# Patient Record
Sex: MALE | Race: ASIAN | NOT HISPANIC OR LATINO | Employment: UNEMPLOYED | URBAN - METROPOLITAN AREA
[De-identification: names, ages, dates, MRNs, and addresses within clinical notes are randomized per-mention and may not be internally consistent; named-entity substitution may affect disease eponyms.]

---
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
## 2024-02-27 ENCOUNTER — TRANSFERRED RECORDS (OUTPATIENT)
Dept: HEALTH INFORMATION MANAGEMENT | Facility: CLINIC | Age: 19
End: 2024-02-27
Payer: COMMERCIAL

## 2024-02-27 ENCOUNTER — MEDICAL CORRESPONDENCE (OUTPATIENT)
Dept: HEALTH INFORMATION MANAGEMENT | Facility: CLINIC | Age: 19
End: 2024-02-27
Payer: COMMERCIAL

## 2024-02-27 LAB
ALT SERPL-CCNC: 23 IU/L (ref 0–44)
AST SERPL-CCNC: 22 IU/L (ref 0–40)
CREATININE (EXTERNAL): 0.87 MG/DL (ref 0.76–1.27)
GFR ESTIMATED (EXTERNAL): 127 ML/MIN/1.73
GLUCOSE (EXTERNAL): 95 MG/DL (ref 70–99)
POTASSIUM (EXTERNAL): 4.2 MMOL/L (ref 3.5–5.2)

## 2024-04-24 ENCOUNTER — TRANSFERRED RECORDS (OUTPATIENT)
Dept: HEALTH INFORMATION MANAGEMENT | Facility: CLINIC | Age: 19
End: 2024-04-24
Payer: COMMERCIAL

## 2024-04-24 LAB
ALT SERPL-CCNC: 15 IU/L (ref 0–44)
AST SERPL-CCNC: 21 IU/L (ref 0–40)
CREATININE (EXTERNAL): 0.91 MG/DL (ref 0.76–1.27)
GFR ESTIMATED (EXTERNAL): 125 ML/MIN/1.73
GLUCOSE (EXTERNAL): 87 MG/DL (ref 70–99)
POTASSIUM (EXTERNAL): 4.2 MMOL/L (ref 3.5–5.2)

## 2024-05-14 ENCOUNTER — TRANSFERRED RECORDS (OUTPATIENT)
Dept: HEALTH INFORMATION MANAGEMENT | Facility: CLINIC | Age: 19
End: 2024-05-14
Payer: COMMERCIAL

## 2024-06-19 ENCOUNTER — TRANSFERRED RECORDS (OUTPATIENT)
Dept: HEALTH INFORMATION MANAGEMENT | Facility: CLINIC | Age: 19
End: 2024-06-19
Payer: COMMERCIAL

## 2024-06-19 LAB
ALT SERPL-CCNC: 21 IU/L (ref 0–44)
AST SERPL-CCNC: 24 IU/L (ref 0–40)
CREATININE (EXTERNAL): 0.88 MG/DL (ref 0.76–1.27)
GFR ESTIMATED (EXTERNAL): 127 ML/MIN/1.73
GLUCOSE (EXTERNAL): 67 MG/DL (ref 70–99)
POTASSIUM (EXTERNAL): 4.4 MMOL/L (ref 3.5–5.2)

## 2024-08-14 ENCOUNTER — TRANSFERRED RECORDS (OUTPATIENT)
Dept: HEALTH INFORMATION MANAGEMENT | Facility: CLINIC | Age: 19
End: 2024-08-14
Payer: COMMERCIAL

## 2024-08-14 LAB
ALT SERPL-CCNC: 14 IU/L (ref 0–44)
AST SERPL-CCNC: 25 IU/L (ref 0–40)
CREATININE (EXTERNAL): 0.95 MG/DL (ref 0.76–1.27)
GFR ESTIMATED (EXTERNAL): 118 ML/MIN/1.73
GLUCOSE (EXTERNAL): 96 MG/DL (ref 70–99)
POTASSIUM (EXTERNAL): 4.2 MMOL/L (ref 3.5–5.2)

## 2024-10-09 ENCOUNTER — TRANSFERRED RECORDS (OUTPATIENT)
Dept: HEALTH INFORMATION MANAGEMENT | Facility: CLINIC | Age: 19
End: 2024-10-09
Payer: COMMERCIAL

## 2024-10-09 LAB
ALT SERPL-CCNC: 17 IU/L (ref 0–44)
AST SERPL-CCNC: 22 IU/L (ref 0–40)
CREATININE (EXTERNAL): 0.93 MG/DL (ref 0.76–1.27)
GFR ESTIMATED (EXTERNAL): 121 ML/MIN/1.73M2
GLUCOSE (EXTERNAL): 96 MG/DL (ref 70–99)

## 2025-03-03 ENCOUNTER — TRANSFERRED RECORDS (OUTPATIENT)
Dept: HEALTH INFORMATION MANAGEMENT | Facility: CLINIC | Age: 20
End: 2025-03-03
Payer: COMMERCIAL

## 2025-03-06 ENCOUNTER — TRANSFERRED RECORDS (OUTPATIENT)
Dept: HEALTH INFORMATION MANAGEMENT | Facility: CLINIC | Age: 20
End: 2025-03-06
Payer: COMMERCIAL

## 2025-04-17 ENCOUNTER — TRANSFERRED RECORDS (OUTPATIENT)
Dept: MULTI SPECIALTY CLINIC | Facility: CLINIC | Age: 20
End: 2025-04-17
Payer: COMMERCIAL

## 2025-04-17 ENCOUNTER — TRANSFERRED RECORDS (OUTPATIENT)
Dept: ADMINISTRATIVE | Facility: CLINIC | Age: 20
End: 2025-04-17
Payer: COMMERCIAL

## 2025-04-17 LAB
ALT SERPL-CCNC: 15 IU/L (ref 0–44)
AST SERPL-CCNC: 22 IU/L (ref 0–40)
CREATININE (EXTERNAL): 1.05 MG/DL (ref 0.76–1.27)
GFR ESTIMATED (EXTERNAL): 104 ML/MIN/1.73
GLUCOSE (EXTERNAL): 98 MG/DL (ref 70–99)
POTASSIUM (EXTERNAL): 4.5 MMOL/L (ref 3.5–5.2)

## 2025-04-18 NOTE — PROGRESS NOTES
_________________________________________________________________________________________________    Patient name: Georgia Engle  YOB: 2005  Encounter date: 04/17/2025 01:00 PM  Current date: 04/17/2025 02:36 PM  Procedure: Infusion      Infusion/Additional Medication Orders    Assessment: Crohn's disease of large intestine with unsp complications K50.119     Medication grids  Order Date Medication Dose Route Rate Rate 2 Rate 3 Rate 4 Int. of Treat.   02/12/2025 Avsola 10mg/kg IV per protocol    week 0, 2 and 6   02/12/2025 Solu-Medrol 40mg IV Push     week 0, 2 and 6   Inf. Date Medication % Conc. Inf. # Therapy Type Bag # Vials Total mgs Lot # Exp. Date   04/17/2025 Avsola  3 Induction dose 3  8 800.00 [id number] 08/31/2028 04/17/2025 Solu-Medrol     1 40.00 [id number] 11/30/2026   Inf. Date Medication IV Site IV Gauge Start Stop Total Time Wt. (lbs) Wt. (kgs)   04/17/2025 Avsola left antecubital 22 1225  PM 0 hour(s) 0 minute(s) 177.00 80.272   04/17/2025 Solu-Medrol left antecubital 22 1220 PM 1222 PM 0 hour(s) 0 minute(s) 177.00 80.272     Vitals Flowsheet  Time Temp Pulse Resp Sys BP Suzi BP Reaction Conc Rate   12:12 PM 97.3 71 16 130 60      1255 PM 97.7 62 16 120 69      1:25 PM 97.4 66 16 114 68      1:55 PM 97.5 61 16 123 66        Post Infusion  The patient did tolerate the infusion.     Nursing Notes  Order date:2/12/25  Last infusion date:3/6/25  Oral premeds per order: Y  Specialty Pharmacy: N  Change in health status per assessment? N (if Y, describe)  IV maintenance 0.9% NS 500ml TKO  Start time:1220  IV start by: Moriah Olivares RN  IV and Fluid D/C time:1446  NS volume infused: <50mL  Site condition: CDI and patent throughout infusion, no redness/swelling at IV site  D/C instructions reviewed, Pt verbalized understanding.  Moriah Olivares RN    *labs drawn from IV    Date Medication Total mg Used mg Wasted mg   04/17/2025 Solu-Medrol 40.00 40.00 0.00   04/17/2025 Avsola 800.00  800.00 0.00   03/06/2025 Solu-Medrol 40.00 40.00 0.00   03/06/2025 Avsola 800.00 773.00 27.00   10/09/2024 Avsola 800.00 759.00 41.00   08/14/2024 Avsola 800.00 735.00 65.00       Visit oversight provided by:  Ramyond Aponte DO 04/17/2025 01:00 PM

## 2025-04-25 ENCOUNTER — TRANSFERRED RECORDS (OUTPATIENT)
Dept: ADMINISTRATIVE | Facility: CLINIC | Age: 20
End: 2025-04-25
Payer: COMMERCIAL

## 2025-04-29 NOTE — PROCEDURES
2025        Georgia Engle   [address] [place] [address]      Georgia Engle,  :  2005    I am writing to let you know the results of the tests that were done the other day.   Thank you for allowing [facility] the opportunity to take part in your healthcare.  At [facility] we strive to provide each patient with the finest gastroenterology care available.  We hope your experience was pleasant and informative.    Your Avsola level looks great and no sign of antibody formation. All good news!      Thank you.    Electronically signed by:  Deja HOLLAND 2025 10:34 AM  Document generated by:  Deja HOLLAND  2025  If your provider ordered multiple tests; the results may not become available at the same time.  If multiple test results are received within 14 days of one another, you may receive a duplicate.  cc:  Marci Vna MD

## 2025-06-12 ENCOUNTER — TRANSFERRED RECORDS (OUTPATIENT)
Facility: HOSPITAL | Age: 20
End: 2025-06-12

## 2025-06-12 ENCOUNTER — TRANSFERRED RECORDS (OUTPATIENT)
Dept: MULTI SPECIALTY CLINIC | Facility: CLINIC | Age: 20
End: 2025-06-12
Payer: COMMERCIAL

## 2025-06-12 LAB
ALT SERPL-CCNC: 19 IU/L (ref 0–44)
AST SERPL-CCNC: 21 IU/L (ref 0–40)
CREATININE (EXTERNAL): 1.04 MG/DL (ref 0.76–1.27)
GFR ESTIMATED (EXTERNAL): 105 ML/MIN/1.73
GLUCOSE (EXTERNAL): 102 MG/DL (ref 70–99)
POTASSIUM (EXTERNAL): 4.6 MMOL/L (ref 3.5–5.2)

## 2025-06-13 NOTE — PROGRESS NOTES
_________________________________________________________________________________________________    Patient name: Georgia Engle  YOB: 2005  Encounter date: 06/12/2025 01:00 PM  Current date: 06/12/2025 02:43 PM  Procedure: Infusion      Infusion/Additional Medication Orders    Assessment: Crohn's disease of colon without complication K50.10     Medication grids  Order Date Medication Dose Route Rate Rate 2 Rate 3 Rate 4 Int. of Treat.   05/09/2025 Remicade 10mg/kg IV 250cc/hr    8 Weeks   05/09/2025 Solu-Medrol 40mg/ml IV Push per protocol    8 Weeks   Inf. Date Medication % Conc. Inf. # Therapy Type Bag # Vials Total mgs Lot # Exp. Date   06/12/2025 Remicade  4 maintenance  8 800.00 [id number] 01/31/2028 06/12/2025 Solu-Medrol  4 pre-infusion  1 40.00 [id number] 11/30/2026   Inf. Date Medication IV Site IV Gauge Start Stop Total Time Wt. (lbs) Wt. (kgs)   06/12/2025 Remicade left antecubital 22 12:55PM 1:55PM  174.50 79.138   06/12/2025 Solu-Medrol left antecubital 22 12:47PM 12:48PM  174.50 79.138     Vitals Flowsheet  Time Temp Pulse Resp Sys BP Suzi BP Reaction Conc Rate   12:41 PM 97.1 63 16 134 78      1:25 PM 97.6 60 16 125 76      1:55 PM 97.5 63 16 125 76        Post Infusion  The patient did tolerate the infusion.     Nursing Notes  Order date: 5/9/2025  Last infusion date: 4/17/2025  Oral premeds per order: Yes, plus IV Solu Medrol 40 mg pre infusion  Specialty Pharmacy: No  Change in health status per assessment? No  IV maintenance 0.9% NS 500ml TKO  Start time: 12:45PM  IV start by: ___Maria Teresa Simmons, ____, RN  IV and Fluid D/C time: 2:10PM  NS volume infused: <50mL  Site condition: CDI and patent throughout infusion, no redness/swelling at IV site  D/C instructions reviewed, Pt verbalized understanding.  ___Maria Teresa Simmons, __RN  Labs obtained from IV site.  Total mgs available:  800.00  Total mgs used:  800.00  Total mgs wasted:  0.00    Date Medication Total mg Used mg Wasted mg    06/12/2025 Solu-Medrol 40.00 40.00 0.00   06/12/2025 Remicade 800.00 800.00 0.00   04/17/2025 Solu-Medrol 40.00 40.00 0.00   04/17/2025 Avsola 800.00 800.00 0.00   03/06/2025 Solu-Medrol 40.00 40.00 0.00   03/06/2025 Avsola 800.00 773.00 27.00       Visit oversight provided by:  Pippa Myers MD 06/12/2025 01:00 PM